# Patient Record
Sex: FEMALE | Race: WHITE | Employment: UNEMPLOYED | URBAN - METROPOLITAN AREA
[De-identification: names, ages, dates, MRNs, and addresses within clinical notes are randomized per-mention and may not be internally consistent; named-entity substitution may affect disease eponyms.]

---

## 2017-11-09 ENCOUNTER — OFFICE VISIT (OUTPATIENT)
Dept: OBGYN CLINIC | Facility: CLINIC | Age: 32
End: 2017-11-09

## 2017-11-09 VITALS
BODY MASS INDEX: 23.66 KG/M2 | RESPIRATION RATE: 16 BRPM | WEIGHT: 142 LBS | DIASTOLIC BLOOD PRESSURE: 72 MMHG | SYSTOLIC BLOOD PRESSURE: 118 MMHG | HEART RATE: 72 BPM | HEIGHT: 65 IN

## 2017-11-09 DIAGNOSIS — N92.0 MENORRHAGIA WITH REGULAR CYCLE: Primary | ICD-10-CM

## 2017-11-09 PROCEDURE — 99204 OFFICE O/P NEW MOD 45 MIN: CPT | Performed by: OBSTETRICS & GYNECOLOGY

## 2017-11-09 RX ORDER — NORETHINDRONE ACETATE AND ETHINYL ESTRADIOL 1MG-20(21)
1 KIT ORAL DAILY
Qty: 1 PACKAGE | Refills: 11 | Status: SHIPPED | OUTPATIENT
Start: 2017-11-09 | End: 2019-10-07

## 2017-11-09 RX ORDER — TRANEXAMIC ACID 650 1/1
1300 TABLET ORAL 3 TIMES DAILY
Qty: 30 TABLET | Refills: 11 | Status: SHIPPED | OUTPATIENT
Start: 2017-11-09 | End: 2019-09-26

## 2017-11-09 NOTE — PROGRESS NOTES
Veronica Benton is a 28year old female [id number] Patient's last menstrual period was 10/28/2017 (exact date). Patient presents with:  Vaginal Problem: new pt. heavy menstraul cycles with clotting x 1-2 years.   .  Patient c/o menses extremely heavy, passi daily., Disp: 1 Package, Rfl: 11  •  ValACYclovir HCl (VALTREX) 1 G Oral Tab, Take 2 tabs PO BID x 2 days, Disp: 24 tablet, Rfl: 1    ALLERGIES:  No Known Allergies      Review of Systems:  Constitutional:  Denies fatigue, night sweats, hot flashes  Eyes: all orders for this visit:    Menorrhagia with regular cycle    Other orders  -     Tranexamic Acid (LYSTEDA) 650 MG Oral Tab; Take 2 tablets (1,300 mg total) by mouth 3 (three) times daily.  Take on heavy menstrual flow days only, no longer then 5 days  -

## 2017-11-13 ENCOUNTER — APPOINTMENT (OUTPATIENT)
Dept: OBGYN CLINIC | Facility: CLINIC | Age: 32
End: 2017-11-13

## 2017-11-14 DIAGNOSIS — N92.0 MENORRHAGIA WITH REGULAR CYCLE: ICD-10-CM

## 2017-11-14 PROCEDURE — 76830 TRANSVAGINAL US NON-OB: CPT | Performed by: OBSTETRICS & GYNECOLOGY

## 2017-11-14 PROCEDURE — 76856 US EXAM PELVIC COMPLETE: CPT | Performed by: OBSTETRICS & GYNECOLOGY

## 2019-09-19 ENCOUNTER — TELEPHONE (OUTPATIENT)
Dept: OBGYN CLINIC | Facility: CLINIC | Age: 34
End: 2019-09-19

## 2019-09-26 ENCOUNTER — OFFICE VISIT (OUTPATIENT)
Dept: OBGYN CLINIC | Facility: CLINIC | Age: 34
End: 2019-09-26
Payer: COMMERCIAL

## 2019-09-26 VITALS
SYSTOLIC BLOOD PRESSURE: 110 MMHG | HEART RATE: 100 BPM | HEIGHT: 65 IN | BODY MASS INDEX: 25.02 KG/M2 | WEIGHT: 150.19 LBS | DIASTOLIC BLOOD PRESSURE: 70 MMHG

## 2019-09-26 DIAGNOSIS — Z01.419 ENCOUNTER FOR WELL WOMAN EXAM WITH ROUTINE GYNECOLOGICAL EXAM: Primary | ICD-10-CM

## 2019-09-26 DIAGNOSIS — Z12.4 CERVICAL CANCER SCREENING: ICD-10-CM

## 2019-09-26 DIAGNOSIS — N63.0 LUMP OR MASS IN BREAST: ICD-10-CM

## 2019-09-26 PROCEDURE — 88175 CYTOPATH C/V AUTO FLUID REDO: CPT | Performed by: OBSTETRICS & GYNECOLOGY

## 2019-09-26 PROCEDURE — 87624 HPV HI-RISK TYP POOLED RSLT: CPT | Performed by: OBSTETRICS & GYNECOLOGY

## 2019-09-26 PROCEDURE — 99395 PREV VISIT EST AGE 18-39: CPT | Performed by: OBSTETRICS & GYNECOLOGY

## 2019-09-26 RX ORDER — TRANEXAMIC ACID 650 1/1
1300 TABLET ORAL 3 TIMES DAILY
Qty: 30 TABLET | Refills: 11 | Status: SHIPPED | OUTPATIENT
Start: 2019-09-26 | End: 2021-07-28

## 2019-09-26 NOTE — PROGRESS NOTES
Antelmo Smith is a 29year old female [id number] Patient's last menstrual period was 2019 (approximate). Patient presents with:  Wellness Visit  .   Patient noticed lump in left breast, no pain  OBSTETRICS HISTORY:  OB History    Para Term Pr Active member of club or organization: Not on file        Attends meetings of clubs or organizations: Not on file        Relationship status: Not on file      Intimate partner violence:        Fear of current or ex partner: Not on file        Emotionally heartburn, abdominal pain, diarrhea or constipation  Genitourinary:  denies dysuria, incontinence, abnormal vaginal discharge, vaginal itching  Musculoskeletal:  denies back pain. Skin/Breast:  Denies any breast pain, lumps, or discharge.    Neurological: MG Oral Tab; Take 2 tablets (1,300 mg total) by mouth 3 (three) times daily.  Take on heavy menstrual flow days only, no longer then 5 days

## 2019-09-29 LAB — HPV I/H RISK 1 DNA SPEC QL NAA+PROBE: NEGATIVE

## 2019-09-30 ENCOUNTER — HOSPITAL ENCOUNTER (OUTPATIENT)
Dept: ULTRASOUND IMAGING | Facility: HOSPITAL | Age: 34
Discharge: HOME | End: 2019-09-30
Attending: OBSTETRICS & GYNECOLOGY
Payer: COMMERCIAL

## 2019-09-30 ENCOUNTER — HOSPITAL ENCOUNTER (OUTPATIENT)
Dept: MAMMOGRAPHY | Facility: HOSPITAL | Age: 34
Discharge: HOME | End: 2019-09-30
Attending: OBSTETRICS & GYNECOLOGY
Payer: COMMERCIAL

## 2019-09-30 ENCOUNTER — TELEPHONE (OUTPATIENT)
Dept: OBGYN CLINIC | Facility: CLINIC | Age: 34
End: 2019-09-30

## 2019-09-30 DIAGNOSIS — N63.20 LEFT BREAST LUMP: Primary | ICD-10-CM

## 2019-09-30 DIAGNOSIS — N63.20 LEFT BREAST LUMP: ICD-10-CM

## 2019-09-30 DIAGNOSIS — N63.0 LUMP OR MASS IN BREAST: ICD-10-CM

## 2019-09-30 PROCEDURE — 76642 ULTRASOUND BREAST LIMITED: CPT | Performed by: OBSTETRICS & GYNECOLOGY

## 2019-09-30 PROCEDURE — 77062 BREAST TOMOSYNTHESIS BI: CPT | Performed by: OBSTETRICS & GYNECOLOGY

## 2019-09-30 PROCEDURE — 77066 DX MAMMO INCL CAD BI: CPT | Performed by: OBSTETRICS & GYNECOLOGY

## 2021-01-07 RX ORDER — TRANEXAMIC ACID 650 MG/1
TABLET ORAL
Qty: 30 TABLET | Refills: 11 | OUTPATIENT
Start: 2021-01-07

## 2023-02-01 ENCOUNTER — OFFICE VISIT (OUTPATIENT)
Facility: CLINIC | Age: 38
End: 2023-02-01
Payer: COMMERCIAL

## 2023-02-01 VITALS
WEIGHT: 159 LBS | HEART RATE: 76 BPM | HEIGHT: 65 IN | DIASTOLIC BLOOD PRESSURE: 60 MMHG | BODY MASS INDEX: 26.49 KG/M2 | SYSTOLIC BLOOD PRESSURE: 122 MMHG

## 2023-02-01 DIAGNOSIS — Z01.419 ENCOUNTER FOR WELL WOMAN EXAM WITH ROUTINE GYNECOLOGICAL EXAM: Primary | ICD-10-CM

## 2023-02-01 DIAGNOSIS — Z12.4 CERVICAL CANCER SCREENING: ICD-10-CM

## 2023-02-01 PROCEDURE — 3008F BODY MASS INDEX DOCD: CPT | Performed by: OBSTETRICS & GYNECOLOGY

## 2023-02-01 PROCEDURE — 3074F SYST BP LT 130 MM HG: CPT | Performed by: OBSTETRICS & GYNECOLOGY

## 2023-02-01 PROCEDURE — 3078F DIAST BP <80 MM HG: CPT | Performed by: OBSTETRICS & GYNECOLOGY

## 2023-02-01 PROCEDURE — 87624 HPV HI-RISK TYP POOLED RSLT: CPT | Performed by: OBSTETRICS & GYNECOLOGY

## 2023-02-01 PROCEDURE — 99395 PREV VISIT EST AGE 18-39: CPT | Performed by: OBSTETRICS & GYNECOLOGY

## 2023-02-01 RX ORDER — BUPROPION HYDROCHLORIDE 150 MG/1
150 TABLET ORAL DAILY
COMMUNITY
Start: 2023-01-10

## 2023-02-01 RX ORDER — BUSPIRONE HYDROCHLORIDE 5 MG/1
5 TABLET ORAL 2 TIMES DAILY
COMMUNITY
Start: 2023-01-10

## 2023-02-02 LAB — HPV I/H RISK 1 DNA SPEC QL NAA+PROBE: NEGATIVE

## 2023-02-28 ENCOUNTER — OFFICE VISIT (OUTPATIENT)
Facility: CLINIC | Age: 38
End: 2023-02-28
Payer: COMMERCIAL

## 2023-02-28 VITALS
HEIGHT: 65 IN | WEIGHT: 159 LBS | HEART RATE: 84 BPM | BODY MASS INDEX: 26.49 KG/M2 | SYSTOLIC BLOOD PRESSURE: 116 MMHG | DIASTOLIC BLOOD PRESSURE: 78 MMHG

## 2023-02-28 DIAGNOSIS — N92.0 MENORRHAGIA WITH REGULAR CYCLE: ICD-10-CM

## 2023-02-28 DIAGNOSIS — Z30.430 ENCOUNTER FOR INSERTION OF MIRENA IUD: Primary | ICD-10-CM

## 2023-02-28 LAB
CONTROL LINE PRESENT WITH A CLEAR BACKGROUND (YES/NO): YES YES/NO
PREGNANCY TEST, URINE: NEGATIVE

## 2023-02-28 PROCEDURE — 3078F DIAST BP <80 MM HG: CPT | Performed by: OBSTETRICS & GYNECOLOGY

## 2023-02-28 PROCEDURE — 3074F SYST BP LT 130 MM HG: CPT | Performed by: OBSTETRICS & GYNECOLOGY

## 2023-02-28 PROCEDURE — 58300 INSERT INTRAUTERINE DEVICE: CPT | Performed by: OBSTETRICS & GYNECOLOGY

## 2023-02-28 PROCEDURE — 3008F BODY MASS INDEX DOCD: CPT | Performed by: OBSTETRICS & GYNECOLOGY

## 2023-02-28 PROCEDURE — 81025 URINE PREGNANCY TEST: CPT | Performed by: OBSTETRICS & GYNECOLOGY

## 2023-02-28 RX ORDER — IBUPROFEN 200 MG
600 TABLET ORAL ONCE
Status: COMPLETED | OUTPATIENT
Start: 2023-02-28 | End: 2023-02-28

## 2023-02-28 RX ADMIN — IBUPROFEN 600 MG: 200 MG TABLET ORAL at 14:18:00

## 2023-02-28 NOTE — PROCEDURES
2023    Patient ok with JORGE ALBERTO García to be present in room for procedure. Procedure: Intrauterine device insertion - Mirena    Date of Procedure: 23    Pre-procedure diagnosis:  Encounter for IUD insertion   Menorrhagia     Post-procedure diagnosis:   Encounter for IUD insertion     Indications:   40year old female  who presents for intrauterine device insertion for menorrhagia. Partner has vasectomy. Patient's last menstrual period was 2023. 23 [employer] - Dr. Kalli Green. Menorrhagia, Vasectomy. Pap & HPV negative. Procedure details: The patient was counseled on various methods of contraception. The patient requested long acting reversible contraception with an intrauterine device. The procedure, risks, benefits and alternatives were discussed with the patient. The patient was informed of risks including but not limited to the risk of bleeding, infection, injury, improper placement, pregnancy and irregular bleeding. All questions and concerns were addressed. The patient provided verbal and written consent. The patient was placed in supine position with legs position in stirrups. Bimanual exam performed. Anteverted uterus, normal sized. A speculum was placed in the vagina and the cervix was visualized. The cervix was cleaned and prepped with betadine. A single tooth tenaculum was placed on the anterior lip of the cervix. The uterus was sounded to 9 cm. The IUD was then prepped and loaded in sterile fashion. The IUD was then inserted through the cervical canal into the uterine cavity and deployed. The strings were visualized at the external os and cut to 3 cm. The single tooth tenaculum was removed. Bleeding noted at the tenaculum site. Pressure was applied. Good hemostasis was then noted. The IUD strings were then tucked behind the cervix and the all instruments were removed from the vagina. The patient tolerated the procedure well.  She was counseled on the recommendation for back up method of contraception for 7 days after placement. The patient verbalized understanding. Disposition: Stable    Complications: None    Follow up: 4 weeks for IUD string check.  WWE due after 2/1/24    Dusty Mendosa MD  [facility] - OB/GYN

## 2023-04-11 ENCOUNTER — OFFICE VISIT (OUTPATIENT)
Facility: CLINIC | Age: 38
End: 2023-04-11
Payer: COMMERCIAL

## 2023-04-11 VITALS
HEART RATE: 105 BPM | HEIGHT: 65 IN | DIASTOLIC BLOOD PRESSURE: 70 MMHG | SYSTOLIC BLOOD PRESSURE: 120 MMHG | BODY MASS INDEX: 25.63 KG/M2 | WEIGHT: 153.81 LBS

## 2023-04-11 DIAGNOSIS — Z30.432 ENCOUNTER FOR IUD REMOVAL: Primary | ICD-10-CM

## 2023-04-11 PROCEDURE — 3008F BODY MASS INDEX DOCD: CPT

## 2023-04-11 PROCEDURE — 58301 REMOVE INTRAUTERINE DEVICE: CPT

## 2023-04-11 PROCEDURE — 3078F DIAST BP <80 MM HG: CPT

## 2023-04-11 PROCEDURE — 3074F SYST BP LT 130 MM HG: CPT

## 2023-04-11 RX ORDER — CHOLECALCIFEROL (VITAMIN D3) 25 MCG
1 TABLET,CHEWABLE ORAL DAILY
COMMUNITY

## 2023-04-11 RX ORDER — MELATONIN
325
COMMUNITY

## 2023-04-11 RX ORDER — DROSPIRENONE 4 MG/1
1 TABLET, FILM COATED ORAL DAILY
Qty: 28 TABLET | Refills: 11 | Status: SHIPPED | OUTPATIENT
Start: 2023-04-11

## 2023-04-11 RX ORDER — LEVONORGESTREL 52 MG/1
1 INTRAUTERINE DEVICE INTRAUTERINE ONCE
COMMUNITY

## 2023-04-11 NOTE — PROCEDURES
IUD Removal     Consent signed. Procedure discussed with the patient in detail including indication, risks, benefits, alternatives and complications. Pelvic Exam Findings:  Lesion description:  IUD strings seen from cervix    Procedure:  Speculum placed in the vagina. Betadine wash of vagina and cervix. An Endocervical speculum was used to visualize strings. A clamp used to grasp IUD strings. Mirena IUD was removed without difficulty. The patient tolerated the procedure well. Visit Plan:  Discharge instructions were reviewed with the patient. Pt wishes to try Slynd to manage her menorrhagia.

## 2024-07-18 ENCOUNTER — OFFICE VISIT (OUTPATIENT)
Facility: CLINIC | Age: 39
End: 2024-07-18
Payer: COMMERCIAL

## 2024-07-18 VITALS
HEIGHT: 65 IN | HEART RATE: 87 BPM | SYSTOLIC BLOOD PRESSURE: 114 MMHG | WEIGHT: 157.19 LBS | BODY MASS INDEX: 26.19 KG/M2 | DIASTOLIC BLOOD PRESSURE: 76 MMHG

## 2024-07-18 DIAGNOSIS — Z12.31 BREAST CANCER SCREENING BY MAMMOGRAM: ICD-10-CM

## 2024-07-18 DIAGNOSIS — N92.1 MENORRHAGIA WITH IRREGULAR CYCLE: ICD-10-CM

## 2024-07-18 DIAGNOSIS — N95.1 PERI-MENOPAUSE: ICD-10-CM

## 2024-07-18 DIAGNOSIS — Z01.419 ENCOUNTER FOR WELL WOMAN EXAM WITH ROUTINE GYNECOLOGICAL EXAM: Primary | ICD-10-CM

## 2024-07-18 PROCEDURE — 3074F SYST BP LT 130 MM HG: CPT

## 2024-07-18 PROCEDURE — 3008F BODY MASS INDEX DOCD: CPT

## 2024-07-18 PROCEDURE — 99395 PREV VISIT EST AGE 18-39: CPT

## 2024-07-18 PROCEDURE — 3078F DIAST BP <80 MM HG: CPT

## 2024-07-18 NOTE — PROGRESS NOTES
Laura Ozuna is a 39 year old female  Patient's last menstrual period was 2024 (exact date).   Chief Complaint   Patient presents with    Wellness Visit     Annual Exam     Gyn Problem     Patient reports her periods have been irregular since 2024 before that \"they were pretty regular\" and very heavy since April.   .  She reports her cycle is very irregular, can be between 19-40 days, bleeds for 5-9 days, heavy, not passing clots   OBSTETRICS HISTORY:  OB History    Para Term  AB Living   3 3 3 0 0 3   SAB IAB Ectopic Multiple Live Births   0 0 0 0 3      # Outcome Date GA Lbr Toribio/2nd Weight Sex Type Anes PTL Lv   3 Term 03/22/15 39w1d 03:15 / 00:06 7 lb 13.2 oz (3.55 kg) F NORMAL SPONT EPI N ELLE      Birth Comments: Passed hearing test      Complications: Fetal tachycardia, Meconium   2 Term 13 39w0d   F NORMAL SPONT  N ELLE   1 Term 09 39w0d   F NORMAL SPONT  N ELLE       GYNE HISTORY:      History   Sexual Activity    Sexual activity: Yes    Partners: Male    Birth control/ protection: Vasectomy        Hx Prior Abnormal Pap: Yes  Pap Date: 23  Pap Result Notes: Negative        MEDICAL HISTORY:  Past Medical History:    Anxiety    Depression    Menorrhagia with regular cycle    Personal history of tobacco use, presenting hazards to health       SURGICAL HISTORY:  Past Surgical History:   Procedure Laterality Date    Cosmetic surgery      Insert intrauterine device  2023    Mirena IUD - sounded 9 cm - anteverted. Dr. Tori Weems       SOCIAL HISTORY:  Social History     Socioeconomic History    Marital status:      Spouse name: Not on file    Number of children: Not on file    Years of education: Not on file    Highest education level: Not on file   Occupational History    Not on file   Tobacco Use    Smoking status: Some Days     Current packs/day: 0.00     Average packs/day: 0.1 packs/day for 15.0 years (1.5 ttl pk-yrs)     Types: Cigarettes      Start date: 1/1/2006     Last attempt to quit: 1/1/2021     Years since quitting: 3.5    Smokeless tobacco: Current    Tobacco comments:     Smoking after work and on weekends   Vaping Use    Vaping status: Never Used   Substance and Sexual Activity    Alcohol use: Yes     Comment: occ    Drug use: No    Sexual activity: Yes     Partners: Male     Birth control/protection: Vasectomy   Other Topics Concern     Service Not Asked    Blood Transfusions Not Asked    Caffeine Concern No     Comment: COFFEE    Occupational Exposure Not Asked    Hobby Hazards Not Asked    Sleep Concern Not Asked    Stress Concern Not Asked    Weight Concern Not Asked    Special Diet Not Asked    Back Care Not Asked    Exercise Yes    Bike Helmet Not Asked    Seat Belt Yes    Self-Exams Not Asked   Social History Narrative    Not on file     Social Determinants of Health     Financial Resource Strain: Not on file   Food Insecurity: Not on file   Transportation Needs: Not on file   Physical Activity: Not on file   Stress: Not on file   Social Connections: Not on file   Housing Stability: Not on file       FAMILY HISTORY:  Family History   Problem Relation Age of Onset    No Known Problems Father     No Known Problems Mother     No Known Problems Maternal Grandmother     No Known Problems Maternal Grandfather     No Known Problems Paternal Grandmother     No Known Problems Paternal Grandfather        MEDICATIONS:    Current Outpatient Medications:     prenatal vitamin with DHA 27-0.8-228 MG Oral Cap, Take 1 capsule by mouth daily., Disp: , Rfl:     buPROPion  MG Oral Tablet 24 Hr, Take 1 tablet (150 mg total) by mouth daily., Disp: , Rfl:     ValACYclovir HCl (VALTREX) 1 G Oral Tab, Take 2 tabs PO BID x 2 days, Disp: 24 tablet, Rfl: 1    ferrous sulfate 325 (65 FE) MG Oral Tab EC, Take 1 tablet (325 mg total) by mouth daily with breakfast. (Patient not taking: Reported on 7/18/2024), Disp: , Rfl:     Levonorgestrel (MIRENA,  52 MG,) 20 MCG/DAY Intrauterine IUD, 20 mcg (1 each total) by Intrauterine route one time. (Patient not taking: Reported on 7/18/2024), Disp: , Rfl:     Drospirenone (SLYND) 4 MG Oral Tab, Take 1 tablet by mouth daily. (Patient not taking: Reported on 7/18/2024), Disp: 28 tablet, Rfl: 11    Hydroquinone 4 % External Cream, Apply 1 Application topically daily., Disp: , Rfl:     ALLERGIES:  No Known Allergies      Review of Systems:  Constitutional:  Denies fatigue, night sweats, hot flashes  Eyes:  denies blurred or double vision  Cardiovascular:  denies chest pain or palpitations  Respiratory:  denies shortness of breath  Gastrointestinal:  denies heartburn, abdominal pain, diarrhea or constipation  Genitourinary:  denies dysuria, incontinence, abnormal vaginal discharge, vaginal itching  Musculoskeletal:  denies back pain.  Skin/Breast:  Denies any breast pain, lumps, or discharge.   Neurological:  denies headaches, extremity weakness or numbness.  Psychiatric: denies depression or anxiety.  Endocrine:   denies excessive thirst or urination.  Heme/Lymph:  denies history of anemia, easy bruising or bleeding.      PHYSICAL EXAM:   Constitutional: well developed, well nourished  Head/Face: normocephalic  Neck/Thyroid: thyroid symmetric, no thyromegaly, no nodules, no adenopathy  Lymphatic:no abnormal supraclavicular or axillary adenopathy is noted  Breast: normal without palpable masses, tenderness, asymmetry, nipple discharge, nipple retraction or skin changes  Abdomen:  soft, nontender, nondistended, no masses  Skin/Hair: no unusual rashes or bruises  Extremities: no edema, no cyanosis  Psychiatric:  Oriented to time, place, person and situation. Appropriate mood and affect    Pelvic Exam:  External Genitalia: normal appearance, hair distribution, and no lesions  Urethral Meatus:  normal in size, location, without lesions and prolapse  Bladder:  No fullness, masses or tenderness  Vagina:  Normal appearance without  lesions, no abnormal discharge  Cervix:  Normal without tenderness on motion  Uterus: normal in size, contour, position, mobility, without tenderness  Adnexa: normal without masses or tenderness  Perineum: normal  Anus: no hemorroids     Assessment & Plan:  Diagnoses and all orders for this visit:    Encounter for well woman exam with routine gynecological exam    Tawanna-menopause  -     FSH; Future  -     LH (Luteinizing Hormone); Future    Menorrhagia with irregular cycle      D/w pt Slynd - is willing to try, 2 samples given.

## (undated) NOTE — MR AVS SNAPSHOT
After Visit Summary   9/26/2019    Unknown Lita    MRN: [id number]           Visit Information     Date & Time  9/26/2019 11:15 AM Provider  Mitchell Smart DO [facility]  [address]  Dept.  Phone  [phone]      Your 9/30/2019 9:20 AM [place] [facility]      Follow-up Instructions    Return in about 1 year (around 9/26/2020) for annual.     Imaging Scheduling Instructions     Around September 26, 2019   Imaging:   [facility] [id number]  Monday - Friday  10:00 am - 10:00 pm  Saturday - Sunday  10:00 am - 4:00 pm      [address]  Monday - Friday  4:00 pm - 10:00 pm  Saturday - Sunday  10:00 am - 4:00 pm